# Patient Record
Sex: MALE | Race: ASIAN | URBAN - METROPOLITAN AREA
[De-identification: names, ages, dates, MRNs, and addresses within clinical notes are randomized per-mention and may not be internally consistent; named-entity substitution may affect disease eponyms.]

---

## 2020-10-30 ENCOUNTER — OFFICE VISIT (OUTPATIENT)
Dept: URBAN - METROPOLITAN AREA CLINIC 92 | Facility: CLINIC | Age: 65
End: 2020-10-30

## 2020-12-04 ENCOUNTER — OFFICE VISIT (OUTPATIENT)
Dept: URBAN - METROPOLITAN AREA CLINIC 92 | Facility: CLINIC | Age: 65
End: 2020-12-04
Payer: MEDICARE

## 2020-12-04 VITALS
HEIGHT: 69 IN | DIASTOLIC BLOOD PRESSURE: 83 MMHG | SYSTOLIC BLOOD PRESSURE: 124 MMHG | BODY MASS INDEX: 28.44 KG/M2 | WEIGHT: 192 LBS | TEMPERATURE: 97 F | HEART RATE: 84 BPM

## 2020-12-04 DIAGNOSIS — Z12.11 SCREENING FOR COLON CANCER: ICD-10-CM

## 2020-12-04 PROCEDURE — 992 APS NON BILLABLE: Performed by: INTERNAL MEDICINE

## 2020-12-04 RX ORDER — SODIUM, POTASSIUM,MAG SULFATES 17.5-3.13G
17.5-13.3-1.6 GM/177ML SOLUTION, RECONSTITUTED, ORAL ORAL
Qty: 354 MILLILITER | Refills: 0 | OUTPATIENT
Start: 2020-12-04

## 2020-12-04 NOTE — HPI-OTHER HISTORIES
This is a 64-year-old male who presents for evaluation of an inguinal hernia.    The patient previously had inguinal hernia repair in 2001.  Now he has recurrence with associated LLQ abdominal pain that is progressively worsening.  He reports having regular bowel movements without hematemesis, melena, hematochezia.  There is no fever, chills, or constitutional symptoms including unintentional weight loss.  He has no family history of colorectal malignancy.  He has never undergone screening colonoscopy in the past

## 2020-12-04 NOTE — PHYSICAL EXAM GASTROINTESTINAL
Abdomen, soft, nontender, nondistended, no guarding or rigidity, no masses palpable, normal bowel sounds, Liver and Spleen, no hepatomegaly present, no hepatosplenomegaly, liver nontender, spleen not palpable, Self Exam: Abdomen soft, non-tender to palpatation, non-distended. There is a left inguinal hernia that is reducible

## 2021-01-21 ENCOUNTER — OFFICE VISIT (OUTPATIENT)
Dept: URBAN - METROPOLITAN AREA CLINIC 35 | Facility: CLINIC | Age: 66
End: 2021-01-21

## 2021-02-01 ENCOUNTER — OFFICE VISIT (OUTPATIENT)
Dept: URBAN - METROPOLITAN AREA CLINIC 82 | Facility: CLINIC | Age: 66
End: 2021-02-01
Payer: MEDICARE

## 2021-02-01 VITALS
RESPIRATION RATE: 16 BRPM | TEMPERATURE: 97.2 F | BODY MASS INDEX: 29.33 KG/M2 | DIASTOLIC BLOOD PRESSURE: 81 MMHG | HEIGHT: 69 IN | WEIGHT: 198 LBS | HEART RATE: 97.2 BPM | SYSTOLIC BLOOD PRESSURE: 116 MMHG

## 2021-02-01 DIAGNOSIS — Z12.11 SCREENING FOR COLON CANCER: ICD-10-CM

## 2021-02-01 DIAGNOSIS — K40.90 INGUINAL HERNIA: ICD-10-CM

## 2021-02-01 PROCEDURE — G8427 DOCREV CUR MEDS BY ELIG CLIN: HCPCS | Performed by: INTERNAL MEDICINE

## 2021-02-01 PROCEDURE — G8417 CALC BMI ABV UP PARAM F/U: HCPCS | Performed by: INTERNAL MEDICINE

## 2021-02-01 PROCEDURE — 3017F COLORECTAL CA SCREEN DOC REV: CPT | Performed by: INTERNAL MEDICINE

## 2021-02-01 PROCEDURE — G8484 FLU IMMUNIZE NO ADMIN: HCPCS | Performed by: INTERNAL MEDICINE

## 2021-02-01 PROCEDURE — 1036F TOBACCO NON-USER: CPT | Performed by: INTERNAL MEDICINE

## 2021-02-01 PROCEDURE — 99213 OFFICE O/P EST LOW 20 MIN: CPT | Performed by: INTERNAL MEDICINE

## 2021-02-01 RX ORDER — SODIUM, POTASSIUM,MAG SULFATES 17.5-3.13G
17.5-13.3-1.6 GM/177ML SOLUTION, RECONSTITUTED, ORAL ORAL
Qty: 354 MILLILITER | Refills: 0 | OUTPATIENT

## 2021-02-01 RX ORDER — SODIUM, POTASSIUM,MAG SULFATES 17.5-3.13G
17.5-13.3-1.6 GM/177ML SOLUTION, RECONSTITUTED, ORAL ORAL
Qty: 354 MILLILITER | Refills: 0 | Status: ACTIVE | COMMUNITY
Start: 2020-12-04

## 2021-02-01 NOTE — HPI-TODAY'S VISIT:
never had colonoscopy. no bleeding p/r no constipation  occ constipation, no abdominal pain or wt loss  no fhcc/fhcp  no heart or lung issues  hernia, right surgery.

## 2021-02-16 ENCOUNTER — OFFICE VISIT (OUTPATIENT)
Dept: URBAN - METROPOLITAN AREA SURGERY CENTER 13 | Facility: SURGERY CENTER | Age: 66
End: 2021-02-16

## 2021-03-12 ENCOUNTER — OFFICE VISIT (OUTPATIENT)
Dept: URBAN - METROPOLITAN AREA SURGERY CENTER 13 | Facility: SURGERY CENTER | Age: 66
End: 2021-03-12
Payer: MEDICARE

## 2021-03-12 DIAGNOSIS — Z12.11 COLON CANCER SCREENING: ICD-10-CM

## 2021-03-12 PROCEDURE — G8907 PT DOC NO EVENTS ON DISCHARG: HCPCS | Performed by: INTERNAL MEDICINE

## 2021-03-12 PROCEDURE — G0121 COLON CA SCRN NOT HI RSK IND: HCPCS | Performed by: INTERNAL MEDICINE

## 2022-11-22 ENCOUNTER — OFFICE VISIT (OUTPATIENT)
Dept: URBAN - METROPOLITAN AREA CLINIC 35 | Facility: CLINIC | Age: 67
End: 2022-11-22

## 2022-12-20 ENCOUNTER — DASHBOARD ENCOUNTERS (OUTPATIENT)
Age: 67
End: 2022-12-20

## 2022-12-21 ENCOUNTER — OFFICE VISIT (OUTPATIENT)
Dept: URBAN - METROPOLITAN AREA CLINIC 82 | Facility: CLINIC | Age: 67
End: 2022-12-21

## 2022-12-21 RX ORDER — VALSARTAN AND HYDROCHLOROTHIAZIDE 160; 12.5 MG/1; MG/1
1 TABLET TABLET, FILM COATED ORAL ONCE A DAY
Status: ACTIVE | COMMUNITY

## 2022-12-21 RX ORDER — LEVOTHYROXINE SODIUM 75 UG/1
1 TABLET IN THE MORNING ON AN EMPTY STOMACH TABLET ORAL ONCE A DAY
Status: ACTIVE | COMMUNITY

## 2022-12-21 RX ORDER — OMEPRAZOLE 40 MG/1
1 CAPSULE 30 MINUTES BEFORE MORNING MEAL CAPSULE, DELAYED RELEASE ORAL ONCE A DAY
Status: ACTIVE | COMMUNITY

## 2022-12-21 RX ORDER — SODIUM, POTASSIUM,MAG SULFATES 17.5-3.13G
17.5-13.3-1.6 GM/177ML SOLUTION, RECONSTITUTED, ORAL ORAL
Qty: 354 MILLILITER | Refills: 0 | COMMUNITY

## 2022-12-21 RX ORDER — MEMANTINE HYDROCHLORIDE 10 MG/1
1 TABLET TABLET ORAL ONCE A DAY
Status: ACTIVE | COMMUNITY

## 2022-12-21 RX ORDER — PRAVASTATIN SODIUM 40 MG/1
1 TABLET TABLET ORAL ONCE A DAY
Status: ACTIVE | COMMUNITY

## 2022-12-21 RX ORDER — DONEPEZIL HYDROCHLORIDE 10 MG/1
1 TABLET AT BEDTIME TABLET, FILM COATED ORAL ONCE A DAY
Status: ACTIVE | COMMUNITY